# Patient Record
Sex: MALE | Race: WHITE | Employment: STUDENT | URBAN - METROPOLITAN AREA
[De-identification: names, ages, dates, MRNs, and addresses within clinical notes are randomized per-mention and may not be internally consistent; named-entity substitution may affect disease eponyms.]

---

## 2023-02-22 ENCOUNTER — HOSPITAL ENCOUNTER (OUTPATIENT)
Dept: PHYSICAL THERAPY | Age: 23
Discharge: HOME | End: 2023-02-22
Payer: COMMERCIAL

## 2023-02-22 PROCEDURE — 97535 SELF CARE MNGMENT TRAINING: CPT

## 2023-02-22 PROCEDURE — 97110 THERAPEUTIC EXERCISES: CPT

## 2023-02-22 PROCEDURE — 97161 PT EVAL LOW COMPLEX 20 MIN: CPT

## 2023-02-22 NOTE — THERAPY EVALUATION
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 Assessment/ key information: Patient is a pleasant 25year old male presenting with thoracic and LBP, sx suggestive of muscle strain. Current symptoms limit functional ability to lift, bend, or perform job duties. Marked deficits include L>R hip weakness, lumbar AROM restriction, core weakness and LE flexibility restriction. Patient will benefit from skilled PT to address all previously listed deficits.         Evaluation Complexity History MEDIUM  Complexity : 1-2 comorbidities / personal factors will impact the outcome/ POC ; Examination LOW Complexity : 1-2 Standardized tests and measures addressing body structure, function, activity limitation and / or participation in recreation  ;Presentation LOW Complexity : Stable, uncomplicated  ;Clinical Decision Making MEDIUM Complexity : FOTO score of 26-74  Overall Complexity Rating: LOW     Problem List: pain affecting function, decrease ROM, decrease strength, decrease ADL/ functional abilitiies, decrease activity tolerance, and decrease flexibility/ joint mobility   Treatment Plan may include any combination of the following: Therapeutic exercise, Neuromuscular reeducation, Manual therapy, Therapeutic activity, Self care/home management, Electric stim unattended , Vasopneumatic device, Gait training, Ultrasound, Mechanical traction, and Needle insertion w/o injection (1 or 2 muscles)  Patient / Family readiness to learn indicated by: asking questions, trying to perform skills, and interest  Persons(s) to be included in education: patient (P)  Barriers to Learning/Limitations: None  Patient Goal (s): Be able to haul the heavy hose, operate jaws of life, lift people and transports. Biggest goal not to throw my back out  Patient Self Reported Health Status: good  Rehabilitation Potential: excellent    Short Term Goals: To be accomplished in 4 weeks:  Patient will be independent with initial HEP in order to transition to general wellness program.  Patient will report worst pain of 6/10 or better to increase QOL and tolerance for sleeping through the night. Patient will demonstrate correct form with squatting and dead lifting to progress exercises and return to gym program.     Long Term Goals: To be accomplished in 12 weeks:  Patient will report worst pain no greater than 2/10 to increase QOL and tolerance for sleeping through the night. 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 Patient will demonstrate gross lumbar AROM WNL without increase in pain to ease bending to don and doff shoes and socks. Frequency / Duration: Patient to be seen 1-2 times per week for up to 12 weeks. Patient/ Caregiver education and instruction: self care, activity modification, and exercises    [x]  Plan of care has been reviewed with RASHEED Hoover DPT 2/22/2023     ________________________________________________________________________    I certify that the above Therapy Services are being furnished while the patient is under my care.  I agree with the treatment plan and certify that this therapy is necessary.     Physician's Signature:____________________  Date:____________Time: _________      EDUARD Yeh
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
## 2023-02-27 ENCOUNTER — HOSPITAL ENCOUNTER (OUTPATIENT)
Dept: PHYSICAL THERAPY | Age: 23
Discharge: HOME | End: 2023-02-27
Payer: COMMERCIAL

## 2023-02-27 PROCEDURE — 97110 THERAPEUTIC EXERCISES: CPT

## 2023-02-27 NOTE — PROGRESS NOTES
PT DAILY TREATMENT NOTE 2-15    Patient Name: Paola Mcgregor  WDDA:  : 2000  [x]  Patient  Verified  Payor: BLUE CROSS / Plan: JAYLAN MARTINEZ [facility] [address] / Product Type: PPO /    In time: 11:03 a  Out time: 11:48  Total Treatment Time (min): 45  Visit #:  2    Treatment Area: Other low back pain [M54.59]    SUBJECTIVE  Pain Level (0-10 scale): 3  Any medication changes, allergies to medications, adverse drug reactions, diagnosis change, or new procedure performed?: [x] No    [] Yes (see summary sheet for update)  Subjective functional status/changes:   [] No changes reported  Patient reports that he is feeling good today, more tight than anything. He had to increase the hold on his PPT to 10 seconds because it was too easy. OBJECTIVE    45 min Therapeutic Exercise:  [x] See flow sheet :   Rationale: increase ROM and increase strength to improve the patients ability to lift, bend, perform ADL's          With   [] TE   [] TA   [] Neuro   [] SC   [] other: Patient Education: [x] Review HEP    [] Progressed/Changed HEP based on:   [] positioning   [] body mechanics   [] transfers   [] heat/ice application    [] other:      Other Objective/Functional Measures: No pain throughout session   Patient demonstrating good squatting form      Pain Level (0-10 scale) post treatment: 2    ASSESSMENT/Changes in Function:   Patient cleared to return to gym activities, LE strengthening with reduced weights and not cleared for return to rope pull core/back exercises. Patient will continue to benefit from skilled PT services to modify and progress therapeutic interventions, address functional mobility deficits, address ROM deficits, address strength deficits, and instruct in home and community integration to attain remaining goals. []  See Plan of Care  []  See progress note/recertification  []  See Discharge Summary         Progress towards goals / Updated goals:   Independent with HEP at initial follow up visit     PLAN  [x]  Upgrade activities as tolerated     [x]  Continue plan of care  []  Update interventions per flow sheet       []  Discharge due to:_  []  Other:_      Kevin Gonzalez DPT 2/27/2023

## 2023-03-02 ENCOUNTER — HOSPITAL ENCOUNTER (OUTPATIENT)
Dept: PHYSICAL THERAPY | Age: 23
Discharge: HOME | End: 2023-03-02
Payer: COMMERCIAL

## 2023-03-02 PROCEDURE — 97110 THERAPEUTIC EXERCISES: CPT

## 2023-03-02 NOTE — PROGRESS NOTES
PT DAILY TREATMENT NOTE 2-15    Patient Name: Renate Abdullahi  Date:3/2/2023  : 2000  [x]  Patient  Verified  Payor: Raoashwin Andre / [facility] [address] / Product Type: PPO /    In time: 8:02 a  Out time: 8:50  Total Treatment Time (min): 48  Visit #:  3    Treatment Area: Other low back pain [M54.59]    SUBJECTIVE  Pain Level (0-10 scale): 4  Any medication changes, allergies to medications, adverse drug reactions, diagnosis change, or new procedure performed?: [x] No    [] Yes (see summary sheet for update)  Subjective functional status/changes:   [] No changes reported  \"Less stiff, more painful. I think I just slept on it funny and drove a little more yesterday. \"   His physical test will be on - pike push and pull (25#), sledge hammer, pull a hose, drag a dummy (150#), carry a hose up stairs, climb 105 foot ladder without stopping. OBJECTIVE    48 min Therapeutic Exercise:  [x] See flow sheet :   Rationale: increase ROM and increase strength to improve the patients ability to lift, bend, perform ADL's          With   [] TE   [] TA   [] Neuro   [] SC   [] other: Patient Education: [x] Review HEP    [] Progressed/Changed HEP based on:   [] positioning   [] body mechanics   [] transfers   [] heat/ice application    [] other:      Other Objective/Functional Measures:   Positive relief with LTR  No increase in pain with strengthening exercises      Pain Level (0-10 scale) post treatment: 2    ASSESSMENT/Changes in Function:   Introduced chops/lifts at low weight with good tolerance, will progress pending long term response to transverse plane motion. Patient may benefit from lumbar mobility interventions prior to strengthening next session.    Patient will continue to benefit from skilled PT services to modify and progress therapeutic interventions, address functional mobility deficits, address ROM deficits, address strength deficits, and instruct in home and community integration to attain remaining goals.      []  See Plan of Care  []  See progress note/recertification  []  See Discharge Summary         Progress towards goals / Updated goals:  Able to advance LE and core strengthening without increase in pain noted today, will work on advancing functional movements as tolerance allows     PLAN  [x]  Upgrade activities as tolerated     [x]  Continue plan of care  []  Update interventions per flow sheet       []  Discharge due to:_  []  Other:_      Tyler Steiner DPT 3/2/2023

## 2023-03-06 ENCOUNTER — HOSPITAL ENCOUNTER (OUTPATIENT)
Dept: PHYSICAL THERAPY | Age: 23
Discharge: HOME | End: 2023-03-06
Payer: COMMERCIAL

## 2023-03-06 PROCEDURE — 97110 THERAPEUTIC EXERCISES: CPT | Performed by: PHYSICAL MEDICINE & REHABILITATION

## 2023-03-06 PROCEDURE — 97112 NEUROMUSCULAR REEDUCATION: CPT | Performed by: PHYSICAL MEDICINE & REHABILITATION

## 2023-03-06 NOTE — PROGRESS NOTES
PT DAILY TREATMENT NOTE 2-15    Patient Name: Sree Monroe  Date:3/6/2023  : 2000  [x]  Patient  Verified  Payor: EDU EID / Plan: JAYLAN MARTINEZ [facility] [address] / Product Type: PPO /    In time: 2405D  Out time: 1225  Total Treatment Time (min): 55  Visit #:  4    Treatment Area: Other low back pain [M54.59]    SUBJECTIVE  Pain Level (0-10 scale): 0  Any medication changes, allergies to medications, adverse drug reactions, diagnosis change, or new procedure performed?: [x] No    [] Yes (see summary sheet for update)  Subjective functional status/changes:   [] No changes reported  Patient reported overall feeling better. No pain present since last visit but did feel tight following. OBJECTIVE    30 min Therapeutic Exercise:  [x] See flow sheet :   Rationale: increase ROM and increase strength to improve the patients ability to lift, bend, perform ADL's    25 min Neuromuscular Re-Education:  [x] See flow sheet :   Rationale: increase ROM and increase strength to improve the patients ability to lift, bend, perform ADL's        With   [] TE   [] TA   [] Neuro   [] SC   [] other: Patient Education: [x] Review HEP    [] Progressed/Changed HEP based on:   [] positioning   [] body mechanics   [] transfers   [] heat/ice application    [] other:      Other Objective/Functional Measures: Added 2 JOHNIE exercises with good tolerance and positive relief of tightness in low back    Educated on squatting and UE lifting techniques. Pain Level (0-10 scale) post treatment: 0/10    ASSESSMENT/Changes in Function:     Patient will continue to benefit from skilled PT services to modify and progress therapeutic interventions, address functional mobility deficits, address ROM deficits, address strength deficits, and instruct in home and community integration to attain remaining goals.      []  See Plan of Care  []  See progress note/recertification  []  See Discharge Summary         Progress towards goals / Updated goals:  Improvement in ability to engage glutes and hamstring with squatting activities.     PLAN  [x]  Upgrade activities as tolerated     [x]  Continue plan of care  []  Update interventions per flow sheet       []  Discharge due to:_  []  Other:_      CONSUELO Pascal PTA, CPT  3/6/2023

## 2023-03-08 ENCOUNTER — HOSPITAL ENCOUNTER (OUTPATIENT)
Dept: PHYSICAL THERAPY | Age: 23
Discharge: HOME | End: 2023-03-08
Payer: COMMERCIAL

## 2023-03-08 PROCEDURE — 97110 THERAPEUTIC EXERCISES: CPT | Performed by: PHYSICAL MEDICINE & REHABILITATION

## 2023-03-08 PROCEDURE — 97112 NEUROMUSCULAR REEDUCATION: CPT | Performed by: PHYSICAL MEDICINE & REHABILITATION

## 2023-03-08 NOTE — PROGRESS NOTES
PT DAILY TREATMENT NOTE 2-15    Patient Name: Ruiz Bone  Date:3/8/2023  : 2000  [x]  Patient  Verified  Payor: Evan Belle / Plan: SABRINABYRON MARTINEZ [facility] [address] / Product Type: PPO /    In time: 9332Y  Out time: 1130a  Total Treatment Time (min): 45  Visit #:  5    Treatment Area: Other low back pain [M54.59]    SUBJECTIVE  Pain Level (0-10 scale): 0  Any medication changes, allergies to medications, adverse drug reactions, diagnosis change, or new procedure performed?: [x] No    [] Yes (see summary sheet for update)  Subjective functional status/changes:   [] No changes reported  Patient reported no issues since last visit. OBJECTIVE    15 min Therapeutic Exercise:  [x] See flow sheet :   Rationale: increase ROM and increase strength to improve the patients ability to lift, bend, perform ADL's    30 min Neuromuscular Re-Education:  [x] See flow sheet :   Rationale: increase ROM and increase strength to improve the patients ability to lift, bend, perform ADL's        With   [] TE   [] TA   [] Neuro   [] SC   [] other: Patient Education: [x] Review HEP    [] Progressed/Changed HEP based on:   [] positioning   [] body mechanics   [] transfers   [] heat/ice application    [] other:      Other Objective/Functional Measures: Added 2 JOHNIE exercises with good tolerance and positive relief of tightness in low back    Educated on squatting and UE lifting techniques. Pain Level (0-10 scale) post treatment: 0/10    ASSESSMENT/Changes in Function:     Patient will continue to benefit from skilled PT services to modify and progress therapeutic interventions, address functional mobility deficits, address ROM deficits, address strength deficits, and instruct in home and community integration to attain remaining goals.      []  See Plan of Care  []  See progress note/recertification  []  See Discharge Summary         Progress towards goals / Updated goals:  Improvement in ability to engage glutes and hamstring with squatting activities.     PLAN  [x]  Upgrade activities as tolerated     [x]  Continue plan of care  []  Update interventions per flow sheet       []  Discharge due to:_  []  Other:_      Bebo Bloom PTA, OPTA, CPT  3/8/2023

## 2023-03-14 ENCOUNTER — HOSPITAL ENCOUNTER (OUTPATIENT)
Dept: PHYSICAL THERAPY | Age: 23
Discharge: HOME | End: 2023-03-14
Payer: COMMERCIAL

## 2023-03-14 PROCEDURE — 97112 NEUROMUSCULAR REEDUCATION: CPT

## 2023-03-14 PROCEDURE — 97110 THERAPEUTIC EXERCISES: CPT

## 2023-03-14 NOTE — PROGRESS NOTES
PT DAILY TREATMENT NOTE 2-15    Patient Name: Chiara Flores  Date:3/14/2023  : 2000  [x]  Patient  Verified  Payor: EDU EID / Plan: JAYLAN MARTINEZ [facility] [address] / Product Type: PPO /    In time: 10:47 a  Out time: 11:30 a  Total Treatment Time (min): 43  Visit #:  6    Treatment Area: Other low back pain [M54.59]    SUBJECTIVE  Pain Level (0-10 scale): 0  Any medication changes, allergies to medications, adverse drug reactions, diagnosis change, or new procedure performed?: [x] No    [] Yes (see summary sheet for update)  Subjective functional status/changes:   [] No changes reported    Patient reports that he has been in the gym every day- bench press, dumbbell work. \"Pretty much everything except for back squats. \"    Patient reports that he has been feeling stronger and having less pain. He is worried about the [place] sled and the dummy drag. OBJECTIVE    30 min Therapeutic Exercise:  [x] See flow sheet :   Rationale: increase ROM and increase strength to improve the patients ability to lift, bend, perform ADL's    13 min Neuromuscular Re-Education:  [x] See flow sheet :   Rationale: increase ROM and increase strength to improve the patients ability to lift, bend, perform ADL's        With   [] TE   [] TA   [] Neuro   [] SC   [] other: Patient Education: [x] Review HEP    [] Progressed/Changed HEP based on:   [] positioning   [] body mechanics   [] transfers   [] heat/ice application    [] other:      Other Objective/Functional Measures:     Multiple LOB with hip hinge, addition of UE assistance to improve mechanics and muscle activation     Unable to hold side plank with hip activation     Pain Level (0-10 scale) post treatment: 0/10    ASSESSMENT/Changes in Function:   Patient able to tolerate all interventions without increased pain. Discussed continued participation 1x/week through March, tapering to every other week as he advances his gym exercises independently.    Patient will continue to benefit from skilled PT services to modify and progress therapeutic interventions, address functional mobility deficits, address ROM deficits, address strength deficits, and instruct in home and community integration to attain remaining goals.      []  See Plan of Care  []  See progress note/recertification  []  See Discharge Summary         Progress towards goals / Updated goals:  Able to increase load on transverse plane movements to mimic many job duties without pain     PLAN  [x]  Upgrade activities as tolerated     [x]  Continue plan of care  []  Update interventions per flow sheet       []  Discharge due to:_  []  Other:_      Armando Alcocer DPT  3/14/2023

## 2023-03-23 ENCOUNTER — HOSPITAL ENCOUNTER (OUTPATIENT)
Dept: PHYSICAL THERAPY | Age: 23
Discharge: HOME | End: 2023-03-23
Payer: COMMERCIAL

## 2023-03-23 PROCEDURE — 97016 VASOPNEUMATIC DEVICE THERAPY: CPT

## 2023-03-23 PROCEDURE — 97110 THERAPEUTIC EXERCISES: CPT

## 2023-03-23 PROCEDURE — 97140 MANUAL THERAPY 1/> REGIONS: CPT

## 2023-03-23 NOTE — PROGRESS NOTES
PT DAILY TREATMENT NOTE 2-15    Patient Name: Avni Bello  Date:3/23/2023  : 2000  [x]  Patient  Verified  Payor: BLUE CROSS / Plan: JAYLAN MARTINEZ [address] / Product Type: PPO /    In time: 10:15 a  Out time: 11:10 a  Total Treatment Time (min): 55  Visit #:  7    Treatment Area: Other low back pain [M54.59]    SUBJECTIVE  Pain Level (0-10 scale): 6, with movement 7-8   Any medication changes, allergies to medications, adverse drug reactions, diagnosis change, or new procedure performed?: [x] No    [] Yes (see summary sheet for update)  Subjective functional status/changes:   [] No changes reported    Patient reports that he was running on the treadmill this morning and he felt a pop and a sharp pain across his back. He has tried heat and foam rolling which helped.      OBJECTIVE    Modality rationale: decrease edema, decrease inflammation, and decrease pain to improve the patient's ability to  lift, bend, perform ADL's   Min Type Additional Details    [] Estim: []Att   []Unatt        []TENS instruct                  []IFC  []Premod   []NMES                     []Other:  []w/US   []w/ice   []w/heat  Position:  Location:    []  Traction: [] Cervical       []Lumbar                       [] Prone          []Supine                       []Intermittent   []Continuous Lbs:  [] before manual  [] after manual  []w/heat    []  Ultrasound: []Continuous   [] Pulsed at:                           []1MHz   []3MHz Location:  W/cm2:    [] Paraffin         Location:   []w/heat    []  Ice     []  Heat  []  Ice massage Position:  Location:    []  Laser  []  Other: Position:  Location:     15 [x]  Vasopneumatic Device: upper back  Pressure:       [x] lo [] med [] hi   Temperature: 34     [x] Skin assessment post-treatment:  [x]intact []redness- no adverse reaction    []redness - adverse reaction:   30 min Therapeutic Exercise:  [x] See flow sheet :   Rationale: increase ROM and increase strength to improve the patients ability to lift, bend, perform ADL's    10 min Manual Therapy:  [] See flow sheet : thoracic and rib assessment, gentle thoracic paraspinals and rhomboid STM    Rationale: increase ROM and increase strength to improve the patients ability to lift, bend, perform ADL's          With   [] TE   [] TA   [] Neuro   [] SC   [] other: Patient Education: [x] Review HEP    [] Progressed/Changed HEP based on:   [] positioning   [] body mechanics   [] transfers   [] heat/ice application    [] other:      Other Objective/Functional Measures:     Pain Level (0-10 scale) post treatment: 0/10    ASSESSMENT/Changes in Function:   Modest relief noted with supine stretching, increased pain through sit to supine and supine to prone transfers. Patient noting pain with breathing. Pain with gentle thoracic PA assessment and palpation of the rib to thoracic vertebrae junction. Patient will be seeing spine MD tomorrow and will determine further PT POC following assessment. Patient will continue to benefit from skilled PT services to modify and progress therapeutic interventions, address functional mobility deficits, address ROM deficits, address strength deficits, and instruct in home and community integration to attain remaining goals.      []  See Plan of Care  []  See progress note/recertification  []  See Discharge Summary         Progress towards goals / Updated goals:  No functional progress noted today     PLAN  []  Upgrade activities as tolerated     []  Continue plan of care  []  Update interventions per flow sheet       []  Discharge due to:_  [x]  Other: holding PT pending follow up with lila Camp DPT  3/23/2023

## 2023-03-29 ENCOUNTER — HOSPITAL ENCOUNTER (OUTPATIENT)
Dept: PHYSICAL THERAPY | Age: 23
Discharge: HOME | End: 2023-03-29
Payer: COMMERCIAL

## 2023-03-29 PROCEDURE — 97110 THERAPEUTIC EXERCISES: CPT | Performed by: PHYSICAL MEDICINE & REHABILITATION

## 2023-03-29 NOTE — PROGRESS NOTES
PT DAILY TREATMENT NOTE 2-15    Patient Name: Lord Hurst  Date:3/29/2023  : 2000  [x]  Patient  Verified  Payor: EDU EID / Plan: JAYLAN MARTINEZ [facility] [address] / Product Type: PPO /    In time: 245p  Out time: 330p  Total Treatment Time (min): 45  Visit #:  8    Treatment Area: Other low back pain [M54.59]    SUBJECTIVE  Pain Level (0-10 scale): 0  Any medication changes, allergies to medications, adverse drug reactions, diagnosis change, or new procedure performed?: [x] No    [] Yes (see summary sheet for update)  Subjective functional status/changes:   [] No changes reported    Patient reports that he is doing much better since starting his steroid pack. MD said it was an inflamed disc per patient. OBJECTIVE    45 min Therapeutic Exercise:  [x] See flow sheet :   Rationale: increase ROM and increase strength to improve the patients ability to lift, bend, perform ADL's          With   [x] TE   [] TA   [] Neuro   [] SC   [] other: Patient Education: [x] Review HEP    [] Progressed/Changed HEP based on:   [] positioning   [] body mechanics   [] transfers   [] heat/ice application    [] other:      Other Objective/Functional Measures:   Educated on proper shoes per [facility] handout    No issues with twisting exercises today  Good tolerance of bounds, linear and lateral.    Pain Level (0-10 scale) post treatment: 0/10    ASSESSMENT/Changes in Function:     Patient will continue to benefit from skilled PT services to modify and progress therapeutic interventions, address functional mobility deficits, address ROM deficits, address strength deficits, and instruct in home and community integration to attain remaining goals. []  See Plan of Care  []  See progress note/recertification  []  See Discharge Summary         Progress towards goals / Updated goals:   Will continue 1 x week for 2 weeks till  test.    PLAN  [x]  Upgrade activities as tolerated     [x]  Continue plan of care  [x]  Update interventions per flow sheet       []  Discharge due to:_  []  Other:     Karl Vasquez PTA, OPTA, CPT  3/29/2023

## 2023-04-05 ENCOUNTER — HOSPITAL ENCOUNTER (OUTPATIENT)
Dept: PHYSICAL THERAPY | Age: 23
End: 2023-04-05
Payer: COMMERCIAL

## 2023-04-05 PROCEDURE — 97110 THERAPEUTIC EXERCISES: CPT

## 2023-04-05 NOTE — PROGRESS NOTES
PT DAILY TREATMENT NOTE 2-15    Patient Name: Denton Phelps  Date:2023  : 2000  [x]  Patient  Verified  Payor: Nic Spencer / Plan: SABRINABYRON MARTINEZ [facility] [address] / Product Type: PPO /    In time: 4:15 p  Out time: 5:00 p  Total Treatment Time (min): 45  Visit #:  9    Treatment Area: Other low back pain [M54.59]    SUBJECTIVE  Pain Level (0-10 scale): 0  Any medication changes, allergies to medications, adverse drug reactions, diagnosis change, or new procedure performed?: [x] No    [] Yes (see summary sheet for update)  Subjective functional status/changes:   [] No changes reported    Patient reports that he has been feeling good. He is still concerned about his endurance. OBJECTIVE    45 min Therapeutic Exercise:  [x] See flow sheet :   Rationale: increase ROM and increase strength to improve the patients ability to lift, bend, perform ADL's          With   [x] TE   [] TA   [] Neuro   [] SC   [] other: Patient Education: [x] Review HEP    [] Progressed/Changed HEP based on:   [] positioning   [] body mechanics   [] transfers   [] heat/ice application    [] other:      Other Objective/Functional Measures:   No pain throughout session  Provided pt with nutritionist handout     Pain Level (0-10 scale) post treatment: 0/10    ASSESSMENT/Changes in Function:     Patient will continue to benefit from skilled PT services to modify and progress therapeutic interventions, address functional mobility deficits, address ROM deficits, address strength deficits, and instruct in home and community integration to attain remaining goals.      []  See Plan of Care  []  See progress note/recertification  []  See Discharge Summary         Progress towards goals / Updated goals:  Able to perform single leg squat with good control, twisting without pain    PLAN  [x]  Upgrade activities as tolerated     [x]  Continue plan of care  [x]  Update interventions per flow sheet       []  Discharge due to:_  []  Other: Renee Rapp, DPT   4/5/2023

## 2023-04-19 ENCOUNTER — APPOINTMENT (OUTPATIENT)
Dept: PHYSICAL THERAPY | Age: 23
End: 2023-04-19
Payer: COMMERCIAL

## 2023-04-24 ENCOUNTER — HOSPITAL ENCOUNTER (OUTPATIENT)
Dept: PHYSICAL THERAPY | Age: 23
Discharge: HOME | End: 2023-04-24
Payer: COMMERCIAL

## 2023-04-24 PROCEDURE — 97110 THERAPEUTIC EXERCISES: CPT | Performed by: PHYSICAL MEDICINE & REHABILITATION

## 2023-04-24 NOTE — PROGRESS NOTES
PT DAILY TREATMENT NOTE 2-15    Patient Name: Vicky Mckenna  Date:2023  : 2000  [x]  Patient  Verified  Payor: EDU EID / Plan: JAYLAN MARTINEZ [facility] [address] / Product Type: PPO /    In time: 415p  Out time: 500p  Total Treatment Time (min): 45  Visit #:  11    Treatment Area: Other low back pain [M54.59]    SUBJECTIVE  Pain Level (0-10 scale): 0  Any medication changes, allergies to medications, adverse drug reactions, diagnosis change, or new procedure performed?: [x] No    [] Yes (see summary sheet for update)  Subjective functional status/changes:   [] No changes reported    Patient reports that he had no trouble with the  test and feeling ready for discharge    OBJECTIVE    45 min Therapeutic Exercise:  [x] See flow sheet :   Rationale: increase ROM and increase strength to improve the patients ability to lift, bend, perform ADL's          With   [x] TE   [] TA   [] Neuro   [] SC   [] other: Patient Education: [x] Review HEP    [] Progressed/Changed HEP based on:   [] positioning   [] body mechanics   [] transfers   [] heat/ice application    [] other:      Other Objective/Functional Measures:   Reviewed HEP    Pain Level (0-10 scale) post treatment: 0/10    ASSESSMENT/Changes in Function:      []  See Plan of Care  []  See progress note/recertification  [x]  See Discharge Summary         Progress towards goals / Updated goals:  Patient has met all STG and LTG at this time. Patient is pain free with all lifting and carrying activities for  training. Patient will continue HEP at this time. Short Term Goals: To be accomplished in 4 weeks:  Patient will be independent with initial HEP in order to transition to general wellness program. - MET  Patient will report worst pain of 6/10 or better to increase QOL and tolerance for sleeping through the night.  MET  Patient will demonstrate correct form with squatting and dead lifting to progress exercises and return to gym program. MET     Long Term Goals: To be accomplished in 12 weeks:  Patient will report worst pain no greater than 2/10 to increase QOL and tolerance for sleeping through the night. 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 MET  Patient will demonstrate gross lumbar AROM WNL without increase in pain to ease bending to don and doff shoes and socks.  MET    PLAN  []  Upgrade activities as tolerated     []  Continue plan of care  []  Update interventions per flow sheet       [x]  Discharge due to: Goals Met  []  Other:     Kyle Young PTA, OPTA, CPT   4/24/2023

## 2023-05-09 ENCOUNTER — HOSPITAL ENCOUNTER (OUTPATIENT)
Facility: HOSPITAL | Age: 23
Discharge: HOME | End: 2023-05-12
Payer: COMMERCIAL

## 2023-05-09 ENCOUNTER — APPOINTMENT (OUTPATIENT)
Dept: NUTRITION | Age: 23
End: 2023-05-09

## 2023-05-09 PROCEDURE — 97803 MED NUTRITION INDIV SUBSEQ: CPT

## 2023-05-09 NOTE — PROGRESS NOTES
NUTRITION - FOLLOW-UP TREATMENT NOTE  Patient Name: Ramu Romeo         Date: 2023  : 2000    YES Patient  Verified  Diagnosis: Z71.3 Dietary Counseling and Surveillance  Class II Obesity, BMI 35-39.9   In time:   830am             Out time:   900am   Total Treatment Time (min):   30     SUBJECTIVE/ASSESSMENT  Current Wt: 296 Previous Wt: 297 Wt Change: -1     Initial Wt: 297 Total Wt change: -1 Height: 72     Changes in medication or medical history? Any new allergies, surgeries or procedures? NO    If yes, update Summary List        Nutrition Diagnosis        Diagnosis Status: Obesity R/T excessive energy intake AEB BMI >30   [x]  Improved []  No Change    []  Declined   []  Discontinued     Food and nutrition related knowledge deficit R/T lack of prior education for healthy weight loss AEB request for counseling  [x]  Improved []  No Change    []  Declined   []  Discontinued        Nutrition Monitoring and Evaluation: Pt seen today for follow-up visit. Pt has been making changes for weight loss. 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 Pt has been watching what he is eating more closely but did not track his calorie intake. Pt feels like it is a bit of a challenge as he does not do his own grocery shopping. Pt reports that he has stopped smoking/vaping. Pt has been able to have more consistent meals in the day. We discussed having 5 small meals to include protein with each meal. It is likely that pt is still exceeding serving sizes for large portions as pt describes meals eating whole avocado or box of mac and cheese. We discussed adding vegetables and decreasing portion of carbohydrate. Pt asked questions about body composition measurements, we reviewed features of smart scale. With pt upcoming drug screening we also reviewed supplement pack that he is taking matched against [employer] guidelines for supplements.     Previous Goals:   - Improve

## 2023-11-10 ENCOUNTER — OFFICE VISIT (OUTPATIENT)
Age: 23
End: 2023-11-10

## 2023-11-10 VITALS
OXYGEN SATURATION: 98 % | WEIGHT: 269.8 LBS | SYSTOLIC BLOOD PRESSURE: 120 MMHG | DIASTOLIC BLOOD PRESSURE: 80 MMHG | HEART RATE: 86 BPM | RESPIRATION RATE: 20 BRPM | TEMPERATURE: 98.6 F

## 2023-11-10 DIAGNOSIS — J06.9 ACUTE URI: Primary | ICD-10-CM

## 2023-11-10 LAB
STREP PYOGENES DNA, POC: NEGATIVE
VALID INTERNAL CONTROL, POC: YES

## 2023-11-10 ASSESSMENT — ENCOUNTER SYMPTOMS: COUGH: 1

## 2025-06-17 ENCOUNTER — HOSPITAL ENCOUNTER (EMERGENCY)
Facility: HOSPITAL | Age: 25
Discharge: HOME | End: 2025-06-17
Attending: EMERGENCY MEDICINE
Payer: COMMERCIAL

## 2025-06-17 ENCOUNTER — OFFICE VISIT (OUTPATIENT)
Age: 25
End: 2025-06-17

## 2025-06-17 ENCOUNTER — APPOINTMENT (OUTPATIENT)
Facility: HOSPITAL | Age: 25
End: 2025-06-17
Payer: COMMERCIAL

## 2025-06-17 VITALS
TEMPERATURE: 98.6 F | SYSTOLIC BLOOD PRESSURE: 99 MMHG | RESPIRATION RATE: 20 BRPM | HEART RATE: 103 BPM | OXYGEN SATURATION: 95 % | HEIGHT: 74 IN | BODY MASS INDEX: 38.5 KG/M2 | WEIGHT: 300 LBS | DIASTOLIC BLOOD PRESSURE: 64 MMHG

## 2025-06-17 VITALS
RESPIRATION RATE: 19 BRPM | HEIGHT: 74 IN | SYSTOLIC BLOOD PRESSURE: 129 MMHG | BODY MASS INDEX: 38.5 KG/M2 | HEART RATE: 97 BPM | WEIGHT: 300 LBS | TEMPERATURE: 99.4 F | DIASTOLIC BLOOD PRESSURE: 66 MMHG | OXYGEN SATURATION: 95 %

## 2025-06-17 DIAGNOSIS — R06.00 DYSPNEA, UNSPECIFIED TYPE: ICD-10-CM

## 2025-06-17 DIAGNOSIS — R00.0 TACHYCARDIA: ICD-10-CM

## 2025-06-17 DIAGNOSIS — I95.9 HYPOTENSION, UNSPECIFIED HYPOTENSION TYPE: ICD-10-CM

## 2025-06-17 DIAGNOSIS — J02.0 STREP PHARYNGITIS: Primary | ICD-10-CM

## 2025-06-17 DIAGNOSIS — R42 DIZZINESS: Primary | ICD-10-CM

## 2025-06-17 LAB
ALBUMIN SERPL-MCNC: 4.2 G/DL (ref 3.5–5)
ALBUMIN/GLOB SERPL: 1.2 (ref 1.1–2.2)
ALP SERPL-CCNC: 115 U/L (ref 45–117)
ALT SERPL-CCNC: 33 U/L (ref 12–78)
ANION GAP SERPL CALC-SCNC: 9 MMOL/L (ref 2–12)
AST SERPL-CCNC: 21 U/L (ref 15–37)
BASOPHILS # BLD: 0.05 K/UL (ref 0–0.1)
BASOPHILS NFR BLD: 0.3 % (ref 0–1)
BILIRUB SERPL-MCNC: 0.7 MG/DL (ref 0.2–1)
BUN SERPL-MCNC: 13 MG/DL (ref 6–20)
BUN/CREAT SERPL: 9 (ref 12–20)
CALCIUM SERPL-MCNC: 8.8 MG/DL (ref 8.5–10.1)
CHLORIDE SERPL-SCNC: 101 MMOL/L (ref 97–108)
CO2 SERPL-SCNC: 26 MMOL/L (ref 21–32)
CREAT SERPL-MCNC: 1.42 MG/DL (ref 0.7–1.3)
DIFFERENTIAL METHOD BLD: ABNORMAL
EKG ATRIAL RATE: 106 BPM
EKG DIAGNOSIS: NORMAL
EKG P AXIS: 49 DEGREES
EKG P-R INTERVAL: 174 MS
EKG Q-T INTERVAL: 312 MS
EKG QRS DURATION: 84 MS
EKG QTC CALCULATION (BAZETT): 414 MS
EKG R AXIS: 21 DEGREES
EKG T AXIS: 54 DEGREES
EKG VENTRICULAR RATE: 106 BPM
EOSINOPHIL # BLD: 0.02 K/UL (ref 0–0.4)
EOSINOPHIL NFR BLD: 0.1 % (ref 0–7)
ERYTHROCYTE [DISTWIDTH] IN BLOOD BY AUTOMATED COUNT: 13 % (ref 11.5–14.5)
FLUAV RNA SPEC QL NAA+PROBE: NOT DETECTED
FLUBV RNA SPEC QL NAA+PROBE: NOT DETECTED
GLOBULIN SER CALC-MCNC: 3.5 G/DL (ref 2–4)
GLUCOSE SERPL-MCNC: 103 MG/DL (ref 65–100)
HCT VFR BLD AUTO: 39.7 % (ref 36.6–50.3)
HGB BLD-MCNC: 14.1 G/DL (ref 12.1–17)
IMM GRANULOCYTES # BLD AUTO: 0.06 K/UL (ref 0–0.04)
IMM GRANULOCYTES NFR BLD AUTO: 0.4 % (ref 0–0.5)
LYMPHOCYTES # BLD: 0.76 K/UL (ref 0.8–3.5)
LYMPHOCYTES NFR BLD: 4.8 % (ref 12–49)
MCH RBC QN AUTO: 30.3 PG (ref 26–34)
MCHC RBC AUTO-ENTMCNC: 35.5 G/DL (ref 30–36.5)
MCV RBC AUTO: 85.4 FL (ref 80–99)
MONOCYTES # BLD: 0.66 K/UL (ref 0–1)
MONOCYTES NFR BLD: 4.2 % (ref 5–13)
NEUTS SEG # BLD: 14.25 K/UL (ref 1.8–8)
NEUTS SEG NFR BLD: 90.2 % (ref 32–75)
NRBC # BLD: 0 K/UL (ref 0–0.01)
NRBC BLD-RTO: 0 PER 100 WBC
PLATELET # BLD AUTO: 202 K/UL (ref 150–400)
PMV BLD AUTO: 9.4 FL (ref 8.9–12.9)
POTASSIUM SERPL-SCNC: 3.7 MMOL/L (ref 3.5–5.1)
PROT SERPL-MCNC: 7.7 G/DL (ref 6.4–8.2)
RBC # BLD AUTO: 4.65 M/UL (ref 4.1–5.7)
RBC MORPH BLD: ABNORMAL
S PYO DNA THROAT QL NAA+PROBE: DETECTED
SARS-COV-2 RNA RESP QL NAA+PROBE: NOT DETECTED
SODIUM SERPL-SCNC: 136 MMOL/L (ref 136–145)
SOURCE: NORMAL
TROPONIN I SERPL HS-MCNC: 4 NG/L (ref 0–76)
WBC # BLD AUTO: 15.8 K/UL (ref 4.1–11.1)

## 2025-06-17 PROCEDURE — 71046 X-RAY EXAM CHEST 2 VIEWS: CPT

## 2025-06-17 PROCEDURE — 80053 COMPREHEN METABOLIC PANEL: CPT

## 2025-06-17 PROCEDURE — 96360 HYDRATION IV INFUSION INIT: CPT

## 2025-06-17 PROCEDURE — 87636 SARSCOV2 & INF A&B AMP PRB: CPT

## 2025-06-17 PROCEDURE — 2580000003 HC RX 258: Performed by: STUDENT IN AN ORGANIZED HEALTH CARE EDUCATION/TRAINING PROGRAM

## 2025-06-17 PROCEDURE — 93005 ELECTROCARDIOGRAM TRACING: CPT | Performed by: EMERGENCY MEDICINE

## 2025-06-17 PROCEDURE — 85025 COMPLETE CBC W/AUTO DIFF WBC: CPT

## 2025-06-17 PROCEDURE — 96361 HYDRATE IV INFUSION ADD-ON: CPT

## 2025-06-17 PROCEDURE — 93010 ELECTROCARDIOGRAM REPORT: CPT | Performed by: INTERNAL MEDICINE

## 2025-06-17 PROCEDURE — 6370000000 HC RX 637 (ALT 250 FOR IP): Performed by: STUDENT IN AN ORGANIZED HEALTH CARE EDUCATION/TRAINING PROGRAM

## 2025-06-17 PROCEDURE — 84484 ASSAY OF TROPONIN QUANT: CPT

## 2025-06-17 PROCEDURE — 99285 EMERGENCY DEPT VISIT HI MDM: CPT

## 2025-06-17 PROCEDURE — 87651 STREP A DNA AMP PROBE: CPT

## 2025-06-17 RX ORDER — 0.9 % SODIUM CHLORIDE 0.9 %
1000 INTRAVENOUS SOLUTION INTRAVENOUS ONCE
Status: COMPLETED | OUTPATIENT
Start: 2025-06-17 | End: 2025-06-17

## 2025-06-17 RX ORDER — AZITHROMYCIN 250 MG/1
TABLET, FILM COATED ORAL
Qty: 6 TABLET | Refills: 0 | Status: SHIPPED | OUTPATIENT
Start: 2025-06-17 | End: 2025-06-27

## 2025-06-17 RX ORDER — ACETAMINOPHEN 500 MG
1000 TABLET ORAL
Status: COMPLETED | OUTPATIENT
Start: 2025-06-17 | End: 2025-06-17

## 2025-06-17 RX ADMIN — SODIUM CHLORIDE 1000 ML: 0.9 INJECTION, SOLUTION INTRAVENOUS at 16:27

## 2025-06-17 RX ADMIN — ACETAMINOPHEN 1000 MG: 500 TABLET ORAL at 16:27

## 2025-06-17 ASSESSMENT — ENCOUNTER SYMPTOMS
EYE PAIN: 0
SORE THROAT: 0
SHORTNESS OF BREATH: 0
DIARRHEA: 0
RHINORRHEA: 1
SORE THROAT: 1
SINUS PAIN: 0
VOMITING: 0
VOMITING: 0
WHEEZING: 0
SWOLLEN GLANDS: 0
COUGH: 0
NAUSEA: 0
DIARRHEA: 0
ABDOMINAL PAIN: 0
ABDOMINAL PAIN: 0
NAUSEA: 0
COUGH: 0

## 2025-06-17 ASSESSMENT — PAIN SCALES - GENERAL: PAINLEVEL_OUTOF10: 3

## 2025-06-17 ASSESSMENT — PAIN DESCRIPTION - LOCATION: LOCATION: THROAT

## 2025-06-17 ASSESSMENT — PAIN DESCRIPTION - DESCRIPTORS: DESCRIPTORS: SORE

## 2025-06-17 NOTE — ED PROVIDER NOTES
Absolute 0.76 (*)     Immature Granulocytes Absolute 0.06 (*)     All other components within normal limits   COMPREHENSIVE METABOLIC PANEL - Abnormal; Notable for the following components:    Glucose 103 (*)     Creatinine 1.42 (*)     BUN/Creatinine Ratio 9 (*)     All other components within normal limits   COVID-19 & INFLUENZA COMBO   TROPONIN       All other labs were within normal range or not returned as of this dictation.    EMERGENCY DEPARTMENT COURSE and DIFFERENTIAL DIAGNOSIS/MDM:   Vitals:    Vitals:    06/17/25 1615 06/17/25 1659 06/17/25 1800   BP: 129/66     Pulse: (!) 109 97 97   Resp: 19 19   Temp: 99.4 °F (37.4 °C)     TempSrc: Oral     SpO2: 94%  95%   Weight: 136.1 kg (300 lb)     Height: 1.88 m (6' 2\")             Medical Decision Making  24-year-old male presents to ED with sore throat, headache and lightheadedness.  Vital signs stable in triage and patient afebrile.  Slight tachycardia which later improved with IV fluids but blood pressure normal.  Physical exam notable for tonsillar erythema and swelling but no exudate.  No lymphadenopathy, heart sounds normal lungs clear to auscultation bilaterally.  Labs notable for slight elevation white blood cell count of 15.8 but otherwise unremarkable.  Slight elevation in creatinine at 1.4 likely indicative of dehydration.  Troponin normal and see below for EKG.  COVID and flu negative but strep positive.  Chest x-ray with no acute process.  No suspicion for sepsis and feel vital signs are most likely consistent with dehydration.  Will treat for strep pharyngitis.    Results and findings have been communicated and explained thoroughly to patient and family members. Patient has been educated on strict return precautions as well as instructions for conservative care and follow-up. Patient will be discharged with prescription for azithromycin and has been given opportunity to ask questions about this new medication.Patient verbalizes understanding and

## 2025-06-17 NOTE — PROGRESS NOTES
place, and time.   Psychiatric:         Behavior: Behavior is slowed (seems mildly confused, slow to respond at times).        Vitals:    06/17/25 1508   BP: 99/64   Pulse: (!) 103   Resp: 20   Temp: 98.6 °F (37 °C)   TempSrc: Oral   SpO2: 95%   Weight: 136.1 kg (300 lb)   Height: 1.88 m (6' 2\")        Allergies   Allergen Reactions    Penicillins Hives       Current Outpatient Medications   Medication Sig Dispense Refill    albuterol sulfate HFA (PROAIR HFA) 108 (90 Base) MCG/ACT inhaler ceived the following from [facility]: Outside name: PROAIR HFA 90 mcg/actuation inhaler      predniSONE (DELTASONE) 20 MG tablet ceived the following from [facility]: Outside name: predniSONE (DELTASONE) 20 mg tablet (Patient not taking: Reported on 6/17/2025)       No current facility-administered medications for this visit.        Past Medical History:   Diagnosis Date    Reactive airway disease         No past surgical history on file.     Social History:   Social Connections: Not on file        Patient Care Team:  Deepak Peres MD as PCP - General  Deepak Peres MD as PCP - Empaneled Provider    There is no problem list on file for this patient.           I ADVISED PATIENT TO GO TO ER IF SYMPTOMS WORSEN, FAIL TO IMPROVE, OR IF NEW SYMPTOMS APPEAR.    I have discussed the diagnosis with the patient and the intended plan as seen in the above orders. The patient also understands that early in the process of an illness, the urgent care workup can be falsely reassuring. Routine discharge counseling and specific return precautions dicussed with the patient and the patient understand that worsening, changing or persistent symptoms should prompt an immediate return to an urgent care or emergency department. Patient/Guardian expressed understanding and agrees with the discharge instructions. No further questions at this time of discharge.  The patient has received an after-visit summary and

## 2025-06-17 NOTE — ED TRIAGE NOTES
Pt ambulatory to treatment area c/o sore throat, headache, dizziness, confusion, chest tightness, SOB since this morning. Pt denies similar PMH. Pt referred to ED by Urgent Care. PT denies V/D, fever.